# Patient Record
Sex: FEMALE | Race: WHITE | NOT HISPANIC OR LATINO | Employment: FULL TIME | URBAN - NONMETROPOLITAN AREA
[De-identification: names, ages, dates, MRNs, and addresses within clinical notes are randomized per-mention and may not be internally consistent; named-entity substitution may affect disease eponyms.]

---

## 2024-02-29 ENCOUNTER — OFFICE VISIT (OUTPATIENT)
Dept: PRIMARY CARE CLINIC | Facility: CLINIC | Age: 64
End: 2024-02-29

## 2024-02-29 VITALS
WEIGHT: 218.13 LBS | OXYGEN SATURATION: 100 % | HEIGHT: 66 IN | RESPIRATION RATE: 18 BRPM | HEART RATE: 63 BPM | DIASTOLIC BLOOD PRESSURE: 65 MMHG | SYSTOLIC BLOOD PRESSURE: 140 MMHG | TEMPERATURE: 99 F | BODY MASS INDEX: 35.06 KG/M2

## 2024-02-29 DIAGNOSIS — J30.2 SEASONAL ALLERGIC RHINITIS, UNSPECIFIED TRIGGER: ICD-10-CM

## 2024-02-29 DIAGNOSIS — Z76.89 ENCOUNTER TO ESTABLISH CARE: Primary | ICD-10-CM

## 2024-02-29 PROCEDURE — 99999 PR PBB SHADOW E&M-NEW PATIENT-LVL III: CPT | Mod: PBBFAC,,, | Performed by: NURSE PRACTITIONER

## 2024-02-29 PROCEDURE — 99202 OFFICE O/P NEW SF 15 MIN: CPT | Mod: S$PBB,,, | Performed by: NURSE PRACTITIONER

## 2024-02-29 PROCEDURE — 99999PBSHW PR PBB SHADOW TECHNICAL ONLY FILED TO HB: Mod: PBBFAC,,,

## 2024-02-29 PROCEDURE — 96372 THER/PROPH/DIAG INJ SC/IM: CPT | Mod: PBBFAC

## 2024-02-29 PROCEDURE — 99203 OFFICE O/P NEW LOW 30 MIN: CPT | Mod: PBBFAC | Performed by: NURSE PRACTITIONER

## 2024-02-29 RX ORDER — MONTELUKAST SODIUM 10 MG/1
10 TABLET ORAL DAILY
COMMUNITY
Start: 2024-01-02 | End: 2024-02-29 | Stop reason: SDUPTHER

## 2024-02-29 RX ORDER — HYDROCHLOROTHIAZIDE 50 MG/1
50 TABLET ORAL DAILY
COMMUNITY

## 2024-02-29 RX ORDER — FLUTICASONE PROPIONATE AND SALMETEROL 250; 50 UG/1; UG/1
1 POWDER RESPIRATORY (INHALATION) 2 TIMES DAILY
COMMUNITY
Start: 2024-02-23 | End: 2024-04-17 | Stop reason: SDUPTHER

## 2024-02-29 RX ORDER — TRIAMCINOLONE ACETONIDE 40 MG/ML
40 INJECTION, SUSPENSION INTRA-ARTICULAR; INTRAMUSCULAR
Status: COMPLETED | OUTPATIENT
Start: 2024-02-29 | End: 2024-02-29

## 2024-02-29 RX ORDER — MONTELUKAST SODIUM 10 MG/1
10 TABLET ORAL DAILY
Qty: 90 TABLET | Refills: 3 | Status: SHIPPED | OUTPATIENT
Start: 2024-02-29 | End: 2025-02-23

## 2024-02-29 RX ADMIN — TRIAMCINOLONE ACETONIDE 40 MG: 40 INJECTION, SUSPENSION INTRA-ARTICULAR; INTRAMUSCULAR at 04:02

## 2024-02-29 NOTE — PROGRESS NOTES
[facility] Note    HPI:  Romelia Givens is a 63 y.o. female who presents today for Establish Care (Pt here to establish care/congestion/allergies)      Review of Systems   Constitutional: Negative.    HENT:  Positive for congestion.    Eyes: Negative.    Respiratory:  Positive for sputum production.    Cardiovascular: Negative.    Gastrointestinal:  Positive for heartburn.   Genitourinary: Negative.    Musculoskeletal: Negative.    Skin: Negative.    Neurological: Negative.    Endo/Heme/Allergies: Negative.    Psychiatric/Behavioral: Negative.        A review of systems was performed and was negative except as noted above.    I personally reviewed allergies, past medical, surgical, social and family history and updated as appropriate.    Medications:    Current Outpatient Medications:     hydroCHLOROthiazide (HYDRODIURIL) 50 MG tablet, Take 50 mg by mouth once daily., Disp: , Rfl:     WIXELA INHUB 250-50 mcg/dose diskus inhaler, Inhale 1 puff into the lungs 2 (two) times daily., Disp: , Rfl:     montelukast (SINGULAIR) 10 mg tablet, Take 1 tablet (10 mg total) by mouth once daily., Disp: 90 tablet, Rfl: 3    Current Facility-Administered Medications:     triamcinolone acetonide injection 40 mg, 40 mg, Intramuscular, 1 time in Clinic/HOD, Doris Arzate NP     Health Maintenance:    There is no immunization history on file for this patient.   Health Maintenance   Topic Date Due    Hepatitis C Screening  Never done    Lipid Panel  Never done    Mammogram  Never done    Colorectal Cancer Screening  Never done    Shingles Vaccine (1 of 2) Never done    TETANUS VACCINE  07/01/2026     Health Maintenance Topics with due status: Not Due       Topic Last Completion Date    TETANUS VACCINE 07/01/2016     Health Maintenance Due   Topic Date Due    Hepatitis C Screening  Never done    Cervical Cancer Screening  Never done    Lipid Panel  Never done    COVID-19 Vaccine (1) Never done    HIV Screening  Never done  "   Mammogram  Never done    Hemoglobin A1c (Diabetic Prevention Screening)  Never done    Colorectal Cancer Screening  Never done    Shingles Vaccine (1 of 2) Never done    RSV Vaccine (Age 60+ and Pregnant patients) (1 - 1-dose 60+ series) Never done    Influenza Vaccine (1) 09/01/2023       PHYSICAL EXAM:  Vitals:    02/29/24 1615   BP: (!) 140/65   Pulse: 63   Resp: 18   Temp: 98.6 °F (37 °C)   TempSrc: Oral   SpO2: 100%   Weight: 98.9 kg (218 lb 1.6 oz)   Height: 5' 6" (1.676 m)     Body mass index is 35.2 kg/m².  Physical Exam  Constitutional:       Appearance: Normal appearance. She is normal weight.   HENT:      Head: Normocephalic.      Right Ear: Tympanic membrane, ear canal and external ear normal.      Left Ear: Tympanic membrane, ear canal and external ear normal.      Nose: Nose normal.      Mouth/Throat:      Mouth: Mucous membranes are moist.   Cardiovascular:      Rate and Rhythm: Normal rate and regular rhythm.      Pulses: Normal pulses.      Heart sounds: Normal heart sounds.   Pulmonary:      Effort: Pulmonary effort is normal.      Breath sounds: Normal breath sounds.   Abdominal:      General: Abdomen is flat.   Musculoskeletal:         General: Normal range of motion.      Cervical back: Normal range of motion.   Skin:     General: Skin is warm and dry.   Neurological:      General: No focal deficit present.      Mental Status: She is alert and oriented to person, place, and time.          ASSESSMENT/PLAN:  1. Encounter to establish care    2. Seasonal allergic rhinitis, unspecified trigger  -     triamcinolone acetonide injection 40 mg    Other orders  -     montelukast (SINGULAIR) 10 mg tablet; Take 1 tablet (10 mg total) by mouth once daily.  Dispense: 90 tablet; Refill: 3        Other than changes above, continue current medications and maintain follow up with specialists.      No follow-ups on file.   No results found for this or any previous visit (from the past 2016 " hour(s)).      Doris Arzate, NOA  [facility]

## 2024-03-06 DIAGNOSIS — Z12.31 OTHER SCREENING MAMMOGRAM: ICD-10-CM

## 2024-04-01 ENCOUNTER — OFFICE VISIT (OUTPATIENT)
Dept: PRIMARY CARE CLINIC | Facility: CLINIC | Age: 64
End: 2024-04-01

## 2024-04-01 VITALS
HEART RATE: 72 BPM | BODY MASS INDEX: 33.89 KG/M2 | RESPIRATION RATE: 18 BRPM | SYSTOLIC BLOOD PRESSURE: 143 MMHG | HEIGHT: 66 IN | DIASTOLIC BLOOD PRESSURE: 67 MMHG | OXYGEN SATURATION: 96 % | WEIGHT: 210.88 LBS | TEMPERATURE: 98 F

## 2024-04-01 DIAGNOSIS — B35.4 RINGWORM, BODY: Primary | ICD-10-CM

## 2024-04-01 DIAGNOSIS — E55.9 VITAMIN D DEFICIENCY: ICD-10-CM

## 2024-04-01 DIAGNOSIS — Z12.31 BREAST CANCER SCREENING BY MAMMOGRAM: ICD-10-CM

## 2024-04-01 DIAGNOSIS — Z13.0 SCREENING FOR IRON DEFICIENCY ANEMIA: ICD-10-CM

## 2024-04-01 DIAGNOSIS — Z12.11 SCREEN FOR COLON CANCER: ICD-10-CM

## 2024-04-01 DIAGNOSIS — Z13.220 NEED FOR LIPID SCREENING: ICD-10-CM

## 2024-04-01 DIAGNOSIS — Z11.59 ENCOUNTER FOR HEPATITIS C SCREENING TEST FOR LOW RISK PATIENT: ICD-10-CM

## 2024-04-01 DIAGNOSIS — Z13.29 THYROID DISORDER SCREEN: ICD-10-CM

## 2024-04-01 DIAGNOSIS — Z13.1 SCREENING FOR DIABETES MELLITUS (DM): ICD-10-CM

## 2024-04-01 DIAGNOSIS — J30.2 SEASONAL ALLERGIC RHINITIS, UNSPECIFIED TRIGGER: ICD-10-CM

## 2024-04-01 DIAGNOSIS — Z11.4 ENCOUNTER FOR SCREENING FOR HIV: ICD-10-CM

## 2024-04-01 PROCEDURE — 96372 THER/PROPH/DIAG INJ SC/IM: CPT | Mod: PBBFAC

## 2024-04-01 PROCEDURE — 99213 OFFICE O/P EST LOW 20 MIN: CPT | Mod: PBBFAC,25 | Performed by: NURSE PRACTITIONER

## 2024-04-01 PROCEDURE — 99213 OFFICE O/P EST LOW 20 MIN: CPT | Mod: S$PBB,,, | Performed by: NURSE PRACTITIONER

## 2024-04-01 PROCEDURE — 99999PBSHW PR PBB SHADOW TECHNICAL ONLY FILED TO HB: Mod: PBBFAC,,,

## 2024-04-01 PROCEDURE — 99999 PR PBB SHADOW E&M-EST. PATIENT-LVL III: CPT | Mod: PBBFAC,,, | Performed by: NURSE PRACTITIONER

## 2024-04-01 RX ORDER — TRIAMCINOLONE ACETONIDE 1 MG/G
CREAM TOPICAL 2 TIMES DAILY
Qty: 45 G | Refills: 0 | Status: SHIPPED | OUTPATIENT
Start: 2024-04-01 | End: 2024-05-01

## 2024-04-01 RX ORDER — CHOLECALCIFEROL (VITAMIN D3) 25 MCG
2000 TABLET ORAL DAILY
COMMUNITY

## 2024-04-01 RX ORDER — TRIAMCINOLONE ACETONIDE 40 MG/ML
40 INJECTION, SUSPENSION INTRA-ARTICULAR; INTRAMUSCULAR
Status: COMPLETED | OUTPATIENT
Start: 2024-04-01 | End: 2024-04-01

## 2024-04-01 RX ORDER — KETOCONAZOLE 20 MG/G
CREAM TOPICAL DAILY
Qty: 30 G | Refills: 0 | Status: SHIPPED | OUTPATIENT
Start: 2024-04-01

## 2024-04-01 RX ADMIN — TRIAMCINOLONE ACETONIDE 40 MG: 40 INJECTION, SUSPENSION INTRA-ARTICULAR; INTRAMUSCULAR at 11:04

## 2024-04-01 NOTE — PROGRESS NOTES
[facility] Note    HPI:  Romelia Givens is a 63 y.o. female who presents today for No chief complaint on file.      Review of Systems   Constitutional: Negative.    HENT:  Positive for congestion.    Eyes: Negative.    Respiratory:  Positive for cough.    Cardiovascular: Negative.    Gastrointestinal: Negative.    Genitourinary: Negative.    Musculoskeletal: Negative.    Skin:  Positive for rash (right buttock).   Neurological: Negative.    Endo/Heme/Allergies: Negative.    Psychiatric/Behavioral: Negative.        A review of systems was performed and was negative except as noted above.    I personally reviewed allergies, past medical, surgical, social and family history and updated as appropriate.    Medications:    Current Outpatient Medications:     hydroCHLOROthiazide (HYDRODIURIL) 50 MG tablet, Take 50 mg by mouth once daily., Disp: , Rfl:     montelukast (SINGULAIR) 10 mg tablet, Take 1 tablet (10 mg total) by mouth once daily., Disp: 90 tablet, Rfl: 3    WIXELA INHUB 250-50 mcg/dose diskus inhaler, Inhale 1 puff into the lungs 2 (two) times daily., Disp: , Rfl:     ketoconazole (NIZORAL) 2 % cream, Apply topically once daily., Disp: 30 g, Rfl: 0    triamcinolone acetonide 0.1% (KENALOG) 0.1 % cream, Apply topically 2 (two) times daily., Disp: 45 g, Rfl: 0    vitamin D (VITAMIN D3) 1000 units Tab, Take 2,000 Units by mouth once daily., Disp: , Rfl:     Current Facility-Administered Medications:     triamcinolone acetonide injection 40 mg, 40 mg, Intramuscular, 1 time in Clinic/HOD, Doris Arzate NP     Health Maintenance:  Immunization History   Administered Date(s) Administered    Influenza - Quadrivalent - PF *Preferred* (6 months and older) 10/10/2016    Influenza - Trivalent (ADULT) 11/30/2000, 10/01/2001, 10/07/2002, 10/04/2004, 10/03/2005, 11/01/2006, 09/16/2009, 02/21/2011, 09/21/2011, 11/01/2012, 09/20/2013    Pneumococcal Conjugate - 13 Valent 05/14/2014    Pneumococcal Polysaccharide  "- 23 Valent 10/10/2016    Td (ADULT) 04/22/2004    Tdap 07/01/2016      Health Maintenance   Topic Date Due    Hepatitis C Screening  Never done    Lipid Panel  Never done    Mammogram  Never done    Colorectal Cancer Screening  Never done    Shingles Vaccine (1 of 2) Never done    TETANUS VACCINE  07/01/2026     Health Maintenance Topics with due status: Not Due       Topic Last Completion Date    TETANUS VACCINE 07/01/2016     Health Maintenance Due   Topic Date Due    Hepatitis C Screening  Never done    Cervical Cancer Screening  Never done    Lipid Panel  Never done    COVID-19 Vaccine (1) Never done    HIV Screening  Never done    Mammogram  Never done    Hemoglobin A1c (Diabetic Prevention Screening)  Never done    Colorectal Cancer Screening  Never done    Shingles Vaccine (1 of 2) Never done    RSV Vaccine (Age 60+ and Pregnant patients) (1 - 1-dose 60+ series) Never done    Influenza Vaccine (1) 09/01/2023       PHYSICAL EXAM:  Vitals:    04/01/24 0959   BP: (!) 143/67   BP Location: Left arm   Patient Position: Sitting   BP Method: Large (Automatic)   Pulse: 72   Resp: 18   Temp: 98.4 °F (36.9 °C)   TempSrc: Temporal   SpO2: 96%   Weight: 95.7 kg (210 lb 14.4 oz)   Height: 5' 6" (1.676 m)     Body mass index is 34.04 kg/m².  Physical Exam  Constitutional:       Appearance: Normal appearance. She is normal weight.   HENT:      Head: Normocephalic.      Right Ear: Tympanic membrane, ear canal and external ear normal.      Left Ear: Tympanic membrane, ear canal and external ear normal.      Nose: Nose normal.      Mouth/Throat:      Mouth: Mucous membranes are moist.   Cardiovascular:      Rate and Rhythm: Normal rate and regular rhythm.      Pulses: Normal pulses.      Heart sounds: Normal heart sounds.   Pulmonary:      Effort: Pulmonary effort is normal.      Breath sounds: Normal breath sounds.   Musculoskeletal:         General: Normal range of motion.      Cervical back: Normal range of motion. "   Skin:     General: Skin is warm and dry.      Findings: Rash (right buttock) present.   Neurological:      General: No focal deficit present.      Mental Status: She is alert and oriented to person, place, and time.          ASSESSMENT/PLAN:  1. Ringworm, body  -     triamcinolone acetonide 0.1% (KENALOG) 0.1 % cream; Apply topically 2 (two) times daily.  Dispense: 45 g; Refill: 0  -     ketoconazole (NIZORAL) 2 % cream; Apply topically once daily.  Dispense: 30 g; Refill: 0    2. Seasonal allergic rhinitis, unspecified trigger  -     triamcinolone acetonide injection 40 mg    3. Screen for colon cancer  -     Ambulatory referral/consult to General Surgery; Future; Expected date: 04/08/2024    4. Breast cancer screening by mammogram  -     Mammo Digital Screening Bilat w/ Eleazar; Future; Expected date: 04/01/2024    5. Vitamin D deficiency  -     Misc Sendout Test, Blood Vitamin D; Future; Expected date: 04/01/2024    6. Screening for diabetes mellitus (DM)  -     Comprehensive Metabolic Panel; Future; Expected date: 04/01/2024  -     Hemoglobin A1C; Future; Expected date: 04/01/2024    7. Screening for iron deficiency anemia  -     CBC Auto Differential; Future; Expected date: 04/01/2024    8. Thyroid disorder screen  -     TSH; Future; Expected date: 04/01/2024    9. Need for lipid screening  -     Lipid Panel; Future; Expected date: 04/01/2024    10. Encounter for hepatitis C screening test for low risk patient  -     Hepatitis C Antibody; Future; Expected date: 04/01/2024    11. Encounter for screening for HIV  -     HIV 1/2 Ag/Ab (4th Gen); Future; Expected date: 04/01/2024        Other than changes above, continue current medications and maintain follow up with specialists.      No follow-ups on file.   No results found for this or any previous visit (from the past 2016 hour(s)).      NOA Smith  [facility]

## 2024-04-10 ENCOUNTER — PATIENT OUTREACH (OUTPATIENT)
Dept: ADMINISTRATIVE | Facility: HOSPITAL | Age: 64
End: 2024-04-10

## 2024-04-17 DIAGNOSIS — J45.20 MILD INTERMITTENT ASTHMA WITHOUT COMPLICATION: Primary | ICD-10-CM

## 2024-04-17 RX ORDER — ALBUTEROL SULFATE 90 UG/1
2 AEROSOL, METERED RESPIRATORY (INHALATION) EVERY 6 HOURS PRN
Qty: 18 G | Refills: 0 | Status: SHIPPED | OUTPATIENT
Start: 2024-04-17 | End: 2025-04-17

## 2024-04-17 RX ORDER — FLUTICASONE PROPIONATE AND SALMETEROL 250; 50 UG/1; UG/1
1 POWDER RESPIRATORY (INHALATION) 2 TIMES DAILY
Qty: 60 EACH | Refills: 11 | Status: SHIPPED | OUTPATIENT
Start: 2024-04-17 | End: 2024-04-22

## 2024-04-22 ENCOUNTER — TELEPHONE (OUTPATIENT)
Dept: SURGERY | Facility: CLINIC | Age: 64
End: 2024-04-22

## 2024-04-22 DIAGNOSIS — J45.20 MILD INTERMITTENT ASTHMA WITHOUT COMPLICATION: Primary | ICD-10-CM

## 2024-04-22 RX ORDER — FLUTICASONE PROPIONATE AND SALMETEROL 250; 50 UG/1; UG/1
1 POWDER RESPIRATORY (INHALATION) 2 TIMES DAILY
Qty: 60 EACH | Refills: 3 | Status: SHIPPED | OUTPATIENT
Start: 2024-04-22 | End: 2024-08-20

## 2024-05-28 ENCOUNTER — OFFICE VISIT (OUTPATIENT)
Dept: SURGERY | Facility: CLINIC | Age: 64
End: 2024-05-28

## 2024-05-28 VITALS
OXYGEN SATURATION: 97 % | HEIGHT: 66 IN | BODY MASS INDEX: 34.37 KG/M2 | DIASTOLIC BLOOD PRESSURE: 73 MMHG | WEIGHT: 213.88 LBS | HEART RATE: 80 BPM | SYSTOLIC BLOOD PRESSURE: 162 MMHG

## 2024-05-28 DIAGNOSIS — Z01.818 PREOPERATIVE CLEARANCE: ICD-10-CM

## 2024-05-28 DIAGNOSIS — Z12.12 SCREENING FOR COLORECTAL CANCER: Primary | ICD-10-CM

## 2024-05-28 DIAGNOSIS — Z12.11 SCREENING FOR COLORECTAL CANCER: Primary | ICD-10-CM

## 2024-05-28 PROCEDURE — 99999 PR PBB SHADOW E&M-EST. PATIENT-LVL V: CPT | Mod: PBBFAC,,,

## 2024-05-28 PROCEDURE — 99215 OFFICE O/P EST HI 40 MIN: CPT | Mod: PBBFAC

## 2024-05-28 PROCEDURE — 99204 OFFICE O/P NEW MOD 45 MIN: CPT | Mod: S$PBB,,,

## 2024-05-28 RX ORDER — SODIUM CHLORIDE 0.9 % (FLUSH) 0.9 %
10 SYRINGE (ML) INJECTION
OUTPATIENT
Start: 2024-05-28

## 2024-05-28 RX ORDER — SODIUM CHLORIDE 9 MG/ML
INJECTION, SOLUTION INTRAVENOUS CONTINUOUS
OUTPATIENT
Start: 2024-05-28

## 2024-05-28 NOTE — H&P
"[facility] H&P      Consult: Screening colonoscopy   Consulting Service: Doris Arzate NP  Chief Complaint: None        HPI: Pt is a 63 y.o.female who presents for Screening colonoscopy . No personal or family history of CRC. No previous colon cancer screening. Has chronic constipation. Denies melena, rectal bleeding or pain, change in bowel habits, or unintended weight loss. Denies CP, SOB, abdominal pain, nausea, vomiting, fevers, or chills.        PMH:   Past Medical History:   Diagnosis Date    Mild intermittent asthma, uncomplicated      PSH:   Past Surgical History:   Procedure Laterality Date     SECTION      MOLE REMOVAL      forehead     Meds: See medication list;  No anticoagulation  ALL: Nsaids (non-steroidal anti-inflammatory drug)  FHX: see above   SOC:   Social History     Socioeconomic History    Marital status:      Spouse name: santy kent    Number of children: 4   Tobacco Use    Smoking status: Never    Smokeless tobacco: Never   Substance and Sexual Activity    Alcohol use: Never    Sexual activity: Yes     Partners: Male     ROS: Review of Systems   Constitutional:  Negative for chills, diaphoresis, fever, malaise/fatigue and weight loss.   Respiratory:  Negative for cough, hemoptysis and shortness of breath.    Cardiovascular:  Negative for chest pain, palpitations and leg swelling.   Gastrointestinal:  Positive for constipation. Negative for abdominal pain, blood in stool, diarrhea, heartburn, melena, nausea and vomiting.   All other systems reviewed and are negative.          Physical Exam:  BP (!) 162/73   Pulse 80   Ht 5' 6" (1.676 m)   Wt 97 kg (213 lb 13.5 oz)   SpO2 97%   BMI 34.52 kg/m²   Physical Exam  Vitals reviewed.   Constitutional:       General: She is not in acute distress.     Appearance: Normal appearance. She is not ill-appearing, toxic-appearing or diaphoretic.   HENT:      Head: Normocephalic and atraumatic.      " Mouth/Throat:      Mouth: Mucous membranes are moist.   Eyes:      Conjunctiva/sclera: Conjunctivae normal.   Cardiovascular:      Rate and Rhythm: Normal rate and regular rhythm.   Pulmonary:      Effort: Pulmonary effort is normal. No respiratory distress.   Abdominal:      General: There is no distension.      Palpations: Abdomen is soft.      Tenderness: There is no abdominal tenderness. There is no guarding or rebound.   Musculoskeletal:         General: Normal range of motion.      Cervical back: Normal range of motion.   Skin:     General: Skin is warm.      Capillary Refill: Capillary refill takes less than 2 seconds.   Neurological:      Mental Status: She is alert and oriented to person, place, and time. Mental status is at baseline.   Psychiatric:         Mood and Affect: Mood normal.         Behavior: Behavior normal.         Thought Content: Thought content normal.         Judgment: Judgment normal.               A/P: Pt is a 63 y.o.female who presents for Screening colonoscopy   --To Endo on 7/5 for colonoscopy  --Procedure explained in detail to patient; including risks including but not limited to bleeding, infection, damage to surrounding structures, and perforation- patient voiced understanding  --Consent obtained and signed  --Pre-op orders placed  --Bowel prep given - Miralax/Gatorade  --Instructions reviewed and given to patient   --CLD day before procedure        Gus Alvarado NP  General Surgery   [phone]

## 2024-06-12 ENCOUNTER — HOSPITAL ENCOUNTER (OUTPATIENT)
Dept: RADIOLOGY | Facility: HOSPITAL | Age: 64
Discharge: HOME | End: 2024-06-12
Attending: NURSE PRACTITIONER

## 2024-06-12 VITALS — HEIGHT: 66 IN | BODY MASS INDEX: 34.23 KG/M2 | WEIGHT: 213 LBS

## 2024-06-12 DIAGNOSIS — Z12.31 BREAST CANCER SCREENING BY MAMMOGRAM: ICD-10-CM

## 2024-06-12 PROCEDURE — 77067 SCR MAMMO BI INCL CAD: CPT | Mod: TC

## 2024-06-14 DIAGNOSIS — R92.8 ABNORMAL MAMMOGRAM: Primary | ICD-10-CM

## 2024-06-19 ENCOUNTER — HOSPITAL ENCOUNTER (OUTPATIENT)
Dept: RADIOLOGY | Facility: HOSPITAL | Age: 64
Discharge: HOME | End: 2024-06-19
Attending: NURSE PRACTITIONER

## 2024-06-19 DIAGNOSIS — R92.8 ABNORMAL MAMMOGRAM: ICD-10-CM

## 2024-06-19 PROCEDURE — 76642 ULTRASOUND BREAST LIMITED: CPT | Mod: TC,LT

## 2024-06-26 ENCOUNTER — PATIENT OUTREACH (OUTPATIENT)
Dept: ADMINISTRATIVE | Facility: HOSPITAL | Age: 64
End: 2024-06-26

## 2024-07-03 ENCOUNTER — TELEPHONE (OUTPATIENT)
Dept: SURGERY | Facility: CLINIC | Age: 64
End: 2024-07-03

## 2024-07-08 ENCOUNTER — ANESTHESIA EVENT (OUTPATIENT)
Dept: ENDOSCOPY | Facility: HOSPITAL | Age: 64
End: 2024-07-08

## 2024-07-08 ENCOUNTER — HOSPITAL ENCOUNTER (OUTPATIENT)
Dept: PREADMISSION TESTING | Facility: HOSPITAL | Age: 64
Discharge: HOME | End: 2024-07-08
Attending: STUDENT IN AN ORGANIZED HEALTH CARE EDUCATION/TRAINING PROGRAM

## 2024-07-08 ENCOUNTER — HOSPITAL ENCOUNTER (OUTPATIENT)
Dept: PULMONOLOGY | Facility: HOSPITAL | Age: 64
Discharge: HOME | End: 2024-07-08
Attending: STUDENT IN AN ORGANIZED HEALTH CARE EDUCATION/TRAINING PROGRAM

## 2024-07-08 VITALS — BODY MASS INDEX: 34.23 KG/M2 | WEIGHT: 213 LBS | HEIGHT: 66 IN

## 2024-07-08 DIAGNOSIS — Z01.818 PREOPERATIVE CLEARANCE: ICD-10-CM

## 2024-07-08 LAB
OHS QRS DURATION: 82 MS
OHS QTC CALCULATION: 425 MS

## 2024-07-08 PROCEDURE — 93010 ELECTROCARDIOGRAM REPORT: CPT | Mod: ,,, | Performed by: INTERNAL MEDICINE

## 2024-07-08 PROCEDURE — 93005 ELECTROCARDIOGRAM TRACING: CPT

## 2024-07-08 NOTE — ANESTHESIA PREPROCEDURE EVALUATION
07/08/2024  Romelia Givens is a 63 y.o., female.      Pre-op Assessment    I have reviewed the Patient Summary Reports.    I have reviewed the NPO Status.   I have reviewed the Medications.     Review of Systems  Anesthesia Hx:  No problems with previous Anesthesia             Denies Family Hx of Anesthesia complications.    Denies Personal Hx of Anesthesia complications.                    Social:  Non-Smoker       Cardiovascular:  Cardiovascular Normal                                            Pulmonary:    Asthma mild                   Renal/:  Renal/ Normal                 Hepatic/GI:  Hepatic/GI Normal                 Neurological:  Neurology Normal                                      Endocrine:  Endocrine Normal                Physical Exam  General: Well nourished    Airway:  Mallampati: II / I  Mouth Opening: Normal  TM Distance: Normal  Tongue: Normal  Neck ROM: Normal ROM    Dental:  Intact    Chest/Lungs:  Clear to auscultation    Heart:  Rate: Normal  Rhythm: Regular Rhythm  Sounds: Normal      Lab Results   Component Value Date    WBC 6.36 06/12/2024    HGB 13.2 06/12/2024    HCT 40.0 06/12/2024    MCV 95 06/12/2024     06/12/2024      CMP  Sodium   Date Value Ref Range Status   06/12/2024 139 136 - 145 mmol/L Final     Potassium   Date Value Ref Range Status   06/12/2024 4.0 3.5 - 5.1 mmol/L Final     Chloride   Date Value Ref Range Status   06/12/2024 105 95 - 110 mmol/L Final     CO2   Date Value Ref Range Status   06/12/2024 26 23 - 29 mmol/L Final     Glucose   Date Value Ref Range Status   06/12/2024 85 70 - 110 mg/dL Final     BUN   Date Value Ref Range Status   06/12/2024 20 8 - 23 mg/dL Final     Creatinine   Date Value Ref Range Status   06/12/2024 0.8 0.5 - 1.4 mg/dL Final     Calcium   Date Value Ref Range Status   06/12/2024 9.0 8.7 - 10.5 mg/dL Final     Total Protein    Date Value Ref Range Status   06/12/2024 6.8 6.0 - 8.4 g/dL Final     Albumin   Date Value Ref Range Status   06/12/2024 3.4 (L) 3.5 - 5.2 g/dL Final     Total Bilirubin   Date Value Ref Range Status   06/12/2024 1.3 (H) 0.1 - 1.0 mg/dL Final     Comment:     For infants and newborns, interpretation of results should be based  on gestational age, weight and in agreement with clinical  observations.    Premature Infant recommended reference ranges:  Up to 24 hours.............<8.0 mg/dL  Up to 48 hours............<12.0 mg/dL  3-5 days..................<15.0 mg/dL  6-29 days.................<15.0 mg/dL    For patients on Eltrombopag therapy, use of Dimension [place] TBIL is   not   recommended.       Alkaline Phosphatase   Date Value Ref Range Status   06/12/2024 69 55 - 135 U/L Final     AST   Date Value Ref Range Status   06/12/2024 14 10 - 40 U/L Final     ALT   Date Value Ref Range Status   06/12/2024 25 10 - 44 U/L Final     Anion Gap   Date Value Ref Range Status   06/12/2024 8 3 - 11 mmol/L Final     eGFR   Date Value Ref Range Status   06/12/2024 >60.0 >60 mL/min/1.73 m^2 Final        Anesthesia Plan  Type of Anesthesia, risks & benefits discussed:    Anesthesia Type: MAC  Intra-op Monitoring Plan: Standard ASA Monitors  Post Op Pain Control Plan: multimodal analgesia  Induction:  IV  Airway Plan: Direct  Informed Consent: Informed consent signed with the Patient and all parties understand the risks and agree with anesthesia plan.  All questions answered.   ASA Score: 2  Day of Surgery Review of History & Physical: I have interviewed and examined the patient. I have reviewed the patient's H&P dated: There are no significant changes.     Ready For Surgery From Anesthesia Perspective.     .

## 2024-07-09 RX ORDER — CETIRIZINE HYDROCHLORIDE 5 MG/1
5 TABLET, CHEWABLE ORAL DAILY
COMMUNITY

## 2024-07-12 ENCOUNTER — ANESTHESIA (OUTPATIENT)
Dept: ENDOSCOPY | Facility: HOSPITAL | Age: 64
End: 2024-07-12

## 2024-07-12 ENCOUNTER — HOSPITAL ENCOUNTER (OUTPATIENT)
Facility: HOSPITAL | Age: 64
Discharge: HOME | End: 2024-07-12
Attending: STUDENT IN AN ORGANIZED HEALTH CARE EDUCATION/TRAINING PROGRAM | Admitting: STUDENT IN AN ORGANIZED HEALTH CARE EDUCATION/TRAINING PROGRAM

## 2024-07-12 VITALS
OXYGEN SATURATION: 95 % | DIASTOLIC BLOOD PRESSURE: 72 MMHG | RESPIRATION RATE: 18 BRPM | SYSTOLIC BLOOD PRESSURE: 111 MMHG | TEMPERATURE: 98 F | HEART RATE: 72 BPM

## 2024-07-12 DIAGNOSIS — Z12.11 SCREENING FOR COLORECTAL CANCER: ICD-10-CM

## 2024-07-12 DIAGNOSIS — Z12.12 SCREENING FOR COLORECTAL CANCER: ICD-10-CM

## 2024-07-12 DIAGNOSIS — Z12.11 SCREEN FOR COLON CANCER: Primary | ICD-10-CM

## 2024-07-12 PROCEDURE — 37000009 HC ANESTHESIA EA ADD 15 MINS: Performed by: STUDENT IN AN ORGANIZED HEALTH CARE EDUCATION/TRAINING PROGRAM

## 2024-07-12 PROCEDURE — G0121 COLON CA SCRN NOT HI RSK IND: HCPCS | Mod: ,,, | Performed by: STUDENT IN AN ORGANIZED HEALTH CARE EDUCATION/TRAINING PROGRAM

## 2024-07-12 PROCEDURE — G0121 COLON CA SCRN NOT HI RSK IND: HCPCS | Performed by: STUDENT IN AN ORGANIZED HEALTH CARE EDUCATION/TRAINING PROGRAM

## 2024-07-12 PROCEDURE — 37000008 HC ANESTHESIA 1ST 15 MINUTES: Performed by: STUDENT IN AN ORGANIZED HEALTH CARE EDUCATION/TRAINING PROGRAM

## 2024-07-12 PROCEDURE — 25000003 PHARM REV CODE 250

## 2024-07-12 RX ORDER — LIDOCAINE HYDROCHLORIDE 10 MG/ML
INJECTION, SOLUTION INTRAVENOUS
Status: DISCONTINUED | OUTPATIENT
Start: 2024-07-12 | End: 2024-07-12

## 2024-07-12 RX ORDER — PROPOFOL 10 MG/ML
VIAL (ML) INTRAVENOUS
Status: DISCONTINUED | OUTPATIENT
Start: 2024-07-12 | End: 2024-07-12

## 2024-07-12 RX ORDER — SODIUM CHLORIDE 9 MG/ML
INJECTION, SOLUTION INTRAVENOUS CONTINUOUS
Status: DISCONTINUED | OUTPATIENT
Start: 2024-07-12 | End: 2024-07-12 | Stop reason: HOSPADM

## 2024-07-12 RX ORDER — ALBUTEROL SULFATE 0.63 MG/3ML
0.63 SOLUTION RESPIRATORY (INHALATION) EVERY 6 HOURS PRN
COMMUNITY

## 2024-07-12 RX ORDER — SODIUM CHLORIDE 0.9 % (FLUSH) 0.9 %
10 SYRINGE (ML) INJECTION
Status: DISCONTINUED | OUTPATIENT
Start: 2024-07-12 | End: 2024-07-12 | Stop reason: HOSPADM

## 2024-07-12 RX ADMIN — LIDOCAINE HYDROCHLORIDE 50 MG: 10 INJECTION, SOLUTION INTRAVENOUS at 07:07

## 2024-07-12 RX ADMIN — SODIUM CHLORIDE: 0.9 INJECTION, SOLUTION INTRAVENOUS at 07:07

## 2024-07-12 RX ADMIN — LIDOCAINE HYDROCHLORIDE 100 MG: 10 INJECTION, SOLUTION INTRAVENOUS at 07:07

## 2024-07-12 RX ADMIN — LIDOCAINE HYDROCHLORIDE 50 MG: 10 INJECTION, SOLUTION INTRAVENOUS at 08:07

## 2024-07-12 RX ADMIN — Medication 100 MG: at 07:07

## 2024-07-12 NOTE — DISCHARGE INSTRUCTIONS
FOLLOW UP WITH DR LENZ AS INSTRUCTED.    NO DRIVING OR DRINKING ALCOHOL FOR 24 HOURS.    CALL DR LENZ'S OFFICE FOR ANY QUESTIONS OR CONCERNS.  REPORT TO THE ER IF URGENT.    THANK YOU FOR CHOOSING [facility]!

## 2024-07-12 NOTE — ANESTHESIA POSTPROCEDURE EVALUATION
Anesthesia Post Evaluation    Patient: Romelia Givens    Procedure(s) Performed: Procedure(s) (LRB):  COLONOSCOPY (N/A)    Final Anesthesia Type: MAC      Patient location during evaluation: OPS  Patient participation: Yes- Able to Participate  Level of consciousness: awake  Post-procedure vital signs: reviewed and stable  Pain management: adequate  Airway patency: patent    PONV status at discharge: No PONV  Anesthetic complications: no      Cardiovascular status: blood pressure returned to baseline  Respiratory status: spontaneous ventilation  Hydration status: euvolemic  Follow-up not needed.              Vitals Value Taken Time   /72 07/12/24 0820   Temp 36.4 °C (97.5 °F) 07/12/24 0820   Pulse 72 07/12/24 0820   Resp 18 07/12/24 0820   SpO2 95 % 07/12/24 0820         No case tracking events are documented in the log.      Pain/John Score: John Score: 10 (7/12/2024  8:20 AM)

## 2024-07-12 NOTE — DISCHARGE SUMMARY
[place] - [facility]oscopy  Discharge Note  Short Stay    Procedure(s) (LRB):  COLONOSCOPY (N/A)      OUTCOME: Patient tolerated treatment/procedure well without complication and is now ready for discharge.    DISPOSITION: Home or Self Care    FINAL DIAGNOSIS:  Screen for colon cancer    FOLLOWUP:  as needed    DISCHARGE INSTRUCTIONS:    Discharge Procedure Orders   Diet Adult Regular     No driving until:     Notify your health care provider if you experience any of the following:  temperature >100.4     Notify your health care provider if you experience any of the following:  persistent nausea and vomiting or diarrhea     Notify your health care provider if you experience any of the following:  severe uncontrolled pain     Activity as tolerated        TIME SPENT ON DISCHARGE: 5 minutes

## 2024-07-12 NOTE — TRANSFER OF CARE
Anesthesia Transfer of Care Note    Patient: Romelia Givens    Procedure(s) Performed: Procedure(s) (LRB):  COLONOSCOPY (N/A)    Patient location: GI    Anesthesia Type: MAC    Transport from OR: Transported from OR on room air with adequate spontaneous ventilation    Post pain: adequate analgesia    Post assessment: no apparent anesthetic complications    Post vital signs: stable    Level of consciousness: awake    Nausea/Vomiting: no nausea/vomiting    Complications: none    Transfer of care protocol was followed      Last vitals:   97/53  16 RR  96%  O2 SAT  81 HR

## 2024-07-12 NOTE — PLAN OF CARE
Received pt to room 521 accompanied by Zenia,RN, pt aa&ox3, no c/o. Snack provided.  at bedside. Call bell in reach. Call with needs.

## 2024-07-12 NOTE — LETTER
July 12, 2024    Romelia Givens  [address] [address]                   [address] [address]  Phone: [phone]  Fax: [phone]   Dear Mrs. Romelia Givens:    Thank you for choosing [facility] for your recent colonoscopy!  We strive to provide our patients with the highest quality preventative care because your health is worth it!    Your recent colonoscopy with Dr. Callejas was normal.     Since you do not have a family history of colon or rectal cancer, it is recommended that you undergo another colonoscopy in 10 years.    I would also recommend colon cancer screening for any first-degree relatives (brothers, sisters, children, and parents) beginning at age 45. Make your family members aware of these results so that they can discuss screening with their physician    I would suggest you consider changing any health habits that might increase your chance of forming more precancerous polyps and thus colorectal cancer. You may lower your chance of developing future polyps and colorectal cancer by adopting healthy habits such as not smoking, maintaining a healthy body weight, being physically active, limiting red and processed meat (such as beef, cold cuts, kamara, and hot dogs),minimizing alcohol intake (or avoiding alcohol altogether), and eating a diet with a lot of fruits and vegetables.    Please feel free to contact me at [phone] with any questions or concerns.    Thank you for allowing me to participate in your care!    Sincerely,     Kady Callejas MD

## 2024-07-12 NOTE — H&P
Patient seen and examined.  No interval change since the below obtained H&P  Informed consent verified    NPO since midnight  Prep completed - Miralax/gatorade  No anticoagulation  All questions and concerns addressed  To Endo for screening colonoscopy     Kady Callejas MD  General Surgery   [phone]      [facility]  General Surgery Clinic H&P      Consult: Screening colonoscopy   Consulting Service: Kady Callejas MD  Chief Complaint: None        HPI: Pt is a 63 y.o.female who presents for Screening colonoscopy . No personal or family history of CRC. No previous colon cancer screening. Has chronic constipation. Denies melena, rectal bleeding or pain, change in bowel habits, or unintended weight loss. Denies CP, SOB, abdominal pain, nausea, vomiting, fevers, or chills.        PMH:   Past Medical History:   Diagnosis Date    Colon cancer screening 2024    Mild intermittent asthma, uncomplicated      PSH:   Past Surgical History:   Procedure Laterality Date     SECTION      MOLE REMOVAL      forehead     Meds: See medication list;  No anticoagulation  ALL: Nsaids (non-steroidal anti-inflammatory drug)  FHX: see above   SOC:   Social History     Socioeconomic History    Marital status:      Spouse name: santy kent    Number of children: 4   Tobacco Use    Smoking status: Never    Smokeless tobacco: Never   Substance and Sexual Activity    Alcohol use: Never    Drug use: Never    Sexual activity: Yes     Partners: Male     Comment:      ROS: Review of Systems   Constitutional:  Negative for chills, diaphoresis, fever, malaise/fatigue and weight loss.   Respiratory:  Negative for cough, hemoptysis and shortness of breath.    Cardiovascular:  Negative for chest pain, palpitations and leg swelling.   Gastrointestinal:  Positive for constipation. Negative for abdominal pain, blood in stool, diarrhea, heartburn, melena, nausea and vomiting.   All other systems reviewed and are  negative.          Physical Exam:  BP (!) 159/87 (BP Location: Left arm, Patient Position: Lying)   Pulse 75   Temp 97.8 °F (36.6 °C) (Oral)   Resp 20   SpO2 97%   Physical Exam  Vitals reviewed.   Constitutional:       General: She is not in acute distress.     Appearance: Normal appearance. She is not ill-appearing, toxic-appearing or diaphoretic.   HENT:      Head: Normocephalic and atraumatic.      Mouth/Throat:      Mouth: Mucous membranes are moist.   Eyes:      Conjunctiva/sclera: Conjunctivae normal.   Cardiovascular:      Rate and Rhythm: Normal rate and regular rhythm.   Pulmonary:      Effort: Pulmonary effort is normal. No respiratory distress.   Abdominal:      General: There is no distension.      Palpations: Abdomen is soft.      Tenderness: There is no abdominal tenderness. There is no guarding or rebound.   Musculoskeletal:         General: Normal range of motion.      Cervical back: Normal range of motion.   Skin:     General: Skin is warm.      Capillary Refill: Capillary refill takes less than 2 seconds.   Neurological:      Mental Status: She is alert and oriented to person, place, and time. Mental status is at baseline.   Psychiatric:         Mood and Affect: Mood normal.         Behavior: Behavior normal.         Thought Content: Thought content normal.         Judgment: Judgment normal.               A/P: Pt is a 63 y.o.female who presents for Screening colonoscopy   --To Endo on 7/5 for colonoscopy  --Procedure explained in detail to patient; including risks including but not limited to bleeding, infection, damage to surrounding structures, and perforation- patient voiced understanding  --Consent obtained and signed  --Pre-op orders placed  --Bowel prep given - Miralax/Gatorade  --Instructions reviewed and given to patient   --CLD day before procedure        Gus Alvarado NP  General Surgery   [phone]

## 2024-07-12 NOTE — OP NOTE
[facility] - OR Peri Services  General Surgery Department  Operative Note    SUMMARY     Date of Procedure: 7/12/2024    Procedure: Procedure(s) (LRB):  COLONOSCOPY:      Surgeon(s) and Role:  Kady Callejas MD     Pre-Operative Diagnosis: Pre-Op Diagnosis Codes:     * Screen for colon cancer [Z12.11]    Post-Operative Diagnosis: Same         Anesthesia: Local MAC      Findings:  -No masses or polypoid lesions seen.    -There was normal mucosa with normal submucosal venous pattern.    -No vascular lesions were identified.    -No major diverticular disease was encountered.   -Grade 1 internal hemorrhoids with external component    Indications for the Procedure:  62yo female with no family history of colorectal cancer who presents for screening colonoscopy.    Operative Conduct in Detail:   The risks, benefits, and alternatives were thoroughly discussed with the patient. Despite the risks, the patient wished to proceed. Informed consent was obtained and it will scanned to the electronic chart.      The patient was placed on left lateral decubitus. After achieving moderate sedation, a digital rectal examination was performed; subsequently, a standard colonoscope was introduced through the anus and advanced without difficulty up to the cecum where terminal ileum and appendiceal orifice were visualized The scope was withdrawn slowly while the mucosa was carefully examined. The following findings were seen:    Bowel Preparation: good  Rectal exam was normal with good rectal tone and no masses or blood detected in the rectal vault.   No masses or polypoid lesions seen.    There was normal mucosa with normal submucosal venous pattern.    No vascular lesions were identified.    No major diverticular disease was encountered.   Grade 1 internal hemorrhoids with external component    Withdrawal time >6 minutes (if no biopsies/polypectomies obtained)      Complications: No    Estimated Blood Loss (EBL): None              Specimens:   Specimens (From admission, onward)      None                   Condition: Good    Disposition: PACU - hemodynamically stable.    Recommendation:    Repeat colonoscopy in 10 years    Kady Callejas MD  General Surgery   [phone]

## 2024-10-15 ENCOUNTER — OFFICE VISIT (OUTPATIENT)
Dept: PRIMARY CARE CLINIC | Facility: CLINIC | Age: 64
End: 2024-10-15

## 2024-10-15 VITALS
TEMPERATURE: 98 F | OXYGEN SATURATION: 96 % | HEART RATE: 67 BPM | SYSTOLIC BLOOD PRESSURE: 124 MMHG | DIASTOLIC BLOOD PRESSURE: 80 MMHG | HEIGHT: 66 IN | BODY MASS INDEX: 33.91 KG/M2 | RESPIRATION RATE: 16 BRPM | WEIGHT: 211 LBS

## 2024-10-15 DIAGNOSIS — J01.10 ACUTE NON-RECURRENT FRONTAL SINUSITIS: Primary | ICD-10-CM

## 2024-10-15 PROCEDURE — 99999PBSHW PR PBB SHADOW TECHNICAL ONLY FILED TO HB: Mod: PBBFAC,,,

## 2024-10-15 PROCEDURE — 99213 OFFICE O/P EST LOW 20 MIN: CPT | Mod: PBBFAC | Performed by: NURSE PRACTITIONER

## 2024-10-15 PROCEDURE — 96372 THER/PROPH/DIAG INJ SC/IM: CPT | Mod: PBBFAC

## 2024-10-15 PROCEDURE — 99213 OFFICE O/P EST LOW 20 MIN: CPT | Mod: S$PBB,,, | Performed by: NURSE PRACTITIONER

## 2024-10-15 PROCEDURE — 99999 PR PBB SHADOW E&M-EST. PATIENT-LVL III: CPT | Mod: PBBFAC,,, | Performed by: NURSE PRACTITIONER

## 2024-10-15 RX ORDER — AZITHROMYCIN 250 MG/1
TABLET, FILM COATED ORAL
Qty: 6 TABLET | Refills: 0 | Status: SHIPPED | OUTPATIENT
Start: 2024-10-15 | End: 2024-10-20

## 2024-10-15 RX ORDER — BETAMETHASONE SODIUM PHOSPHATE AND BETAMETHASONE ACETATE 3; 3 MG/ML; MG/ML
6 INJECTION, SUSPENSION INTRA-ARTICULAR; INTRALESIONAL; INTRAMUSCULAR; SOFT TISSUE
Status: COMPLETED | OUTPATIENT
Start: 2024-10-15 | End: 2024-10-15

## 2024-10-15 RX ADMIN — BETAMETHASONE SODIUM PHOSPHATE AND BETAMETHASONE ACETATE 6 MG: 3; 3 INJECTION, SUSPENSION INTRA-ARTICULAR; INTRALESIONAL; INTRAMUSCULAR at 09:10

## 2024-10-15 NOTE — PROGRESS NOTES
[facility] Note    HPI:  Romelia Givens is a 64 y.o. female who presents today for Nasal Congestion and Cough     Symptoms x 2 weeks    Review of Systems   Constitutional: Negative.    HENT:  Positive for congestion and sinus pain.    Eyes: Negative.    Respiratory:  Positive for cough.    Cardiovascular: Negative.    Gastrointestinal: Negative.    Genitourinary: Negative.    Musculoskeletal: Negative.    Skin: Negative.    Neurological: Negative.    Endo/Heme/Allergies: Negative.    Psychiatric/Behavioral: Negative.        A review of systems was performed and was negative except as noted above.    I personally reviewed allergies, past medical, surgical, social and family history and updated as appropriate.    Medications:    Current Outpatient Medications:     albuterol (ACCUNEB) 0.63 mg/3 mL Nebu, Take 0.63 mg by nebulization every 6 (six) hours as needed. Rescue, Disp: , Rfl:     albuterol (VENTOLIN HFA) 90 mcg/actuation inhaler, Inhale 2 puffs into the lungs every 6 (six) hours as needed for Wheezing. Rescue, Disp: 18 g, Rfl: 0    cetirizine (ZYRTEC) 5 MG chewable tablet, Take 5 mg by mouth once daily., Disp: , Rfl:     fluticasone-salmeterol diskus inhaler 250-50 mcg, Inhale 1 puff into the lungs 2 (two) times daily. Controller, Disp: 60 each, Rfl: 3    montelukast (SINGULAIR) 10 mg tablet, Take 1 tablet (10 mg total) by mouth once daily., Disp: 90 tablet, Rfl: 3    vitamin D (VITAMIN D3) 1000 units Tab, Take 2,000 Units by mouth once daily., Disp: , Rfl:     azithromycin (Z-AYDEN) 250 MG tablet, Take 2 tablets by mouth on day 1; Take 1 tablet by mouth on days 2-5, Disp: 6 tablet, Rfl: 0    Current Facility-Administered Medications:     betamethasone acetate-betamethasone sodium phosphate injection 6 mg, 6 mg, Intramuscular, 1 time in Clinic/HOD,      Health Maintenance:  Immunization History   Administered Date(s) Administered    Influenza - Quadrivalent - PF *Preferred* (6 months and older)  "10/10/2016    Influenza - Trivalent - Afluria, Fluzone MDV 11/30/2000, 10/01/2001, 10/07/2002, 10/04/2004, 10/03/2005, 11/01/2006, 09/16/2009, 02/21/2011, 09/21/2011, 11/01/2012, 09/20/2013    Pneumococcal Conjugate - 13 Valent 05/14/2014    Pneumococcal Polysaccharide - 23 Valent 10/10/2016    Td (ADULT) 04/22/2004    Tdap 07/01/2016      Health Maintenance   Topic Date Due    Shingles Vaccine (1 of 2) Never done    Mammogram  06/12/2025    TETANUS VACCINE  07/01/2026    Lipid Panel  06/12/2029    Colorectal Cancer Screening  07/12/2034    Hepatitis C Screening  Completed     Health Maintenance Topics with due status: Not Due       Topic Last Completion Date    TETANUS VACCINE 07/01/2016    Hemoglobin A1c (Diabetic Prevention Screening) 06/12/2024    Mammogram 06/12/2024    Lipid Panel 06/12/2024    Colorectal Cancer Screening 07/12/2024    RSV Vaccine (Age 60+ and Pregnant patients) Not Due     Health Maintenance Due   Topic Date Due    Cervical Cancer Screening  Never done    Shingles Vaccine (1 of 2) Never done    Influenza Vaccine (1) 09/01/2024    COVID-19 Vaccine (1 - 2024-25 season) Never done       PHYSICAL EXAM:  Vitals:    10/15/24 0938   BP: 124/80   Pulse: 67   Resp: 16   Temp: 97.9 °F (36.6 °C)   SpO2: 96%   Weight: 95.7 kg (211 lb)   Height: 5' 6" (1.676 m)     Body mass index is 34.06 kg/m².  Physical Exam  Constitutional:       Appearance: Normal appearance. She is normal weight.   HENT:      Head: Normocephalic.      Right Ear: Tympanic membrane, ear canal and external ear normal.      Left Ear: Tympanic membrane, ear canal and external ear normal.      Nose: Nose normal.      Mouth/Throat:      Mouth: Mucous membranes are moist.   Cardiovascular:      Rate and Rhythm: Normal rate and regular rhythm.      Pulses: Normal pulses.      Heart sounds: Normal heart sounds.   Pulmonary:      Effort: Pulmonary effort is normal.      Breath sounds: Normal breath sounds.   Musculoskeletal:         General: " Normal range of motion.      Cervical back: Normal range of motion.   Skin:     General: Skin is warm and dry.   Neurological:      General: No focal deficit present.      Mental Status: She is alert and oriented to person, place, and time.          ASSESSMENT/PLAN:  1. Acute non-recurrent frontal sinusitis  -     azithromycin (Z-AYDEN) 250 MG tablet; Take 2 tablets by mouth on day 1; Take 1 tablet by mouth on days 2-5  Dispense: 6 tablet; Refill: 0  -     betamethasone acetate-betamethasone sodium phosphate injection 6 mg        Other than changes above, continue current medications and maintain follow up with specialists.      Follow up if symptoms worsen or fail to improve.   No results found for this or any previous visit (from the past 12 weeks).      Doris Arzate, NOA  [facility]

## 2024-12-06 ENCOUNTER — PATIENT MESSAGE (OUTPATIENT)
Dept: ADMINISTRATIVE | Facility: HOSPITAL | Age: 64
End: 2024-12-06

## 2025-01-24 ENCOUNTER — OFFICE VISIT (OUTPATIENT)
Dept: PRIMARY CARE CLINIC | Facility: CLINIC | Age: 65
End: 2025-01-24

## 2025-01-24 VITALS
BODY MASS INDEX: 33.59 KG/M2 | DIASTOLIC BLOOD PRESSURE: 70 MMHG | TEMPERATURE: 98 F | SYSTOLIC BLOOD PRESSURE: 144 MMHG | HEIGHT: 66 IN | HEART RATE: 68 BPM | WEIGHT: 209 LBS | OXYGEN SATURATION: 98 %

## 2025-01-24 DIAGNOSIS — J01.10 ACUTE NON-RECURRENT FRONTAL SINUSITIS: Primary | ICD-10-CM

## 2025-01-24 PROCEDURE — 96372 THER/PROPH/DIAG INJ SC/IM: CPT | Mod: PBBFAC

## 2025-01-24 PROCEDURE — 99213 OFFICE O/P EST LOW 20 MIN: CPT | Mod: PBBFAC | Performed by: NURSE PRACTITIONER

## 2025-01-24 PROCEDURE — 99999PBSHW PR PBB SHADOW TECHNICAL ONLY FILED TO HB: Mod: PBBFAC,,,

## 2025-01-24 PROCEDURE — 99999 PR PBB SHADOW E&M-EST. PATIENT-LVL III: CPT | Mod: PBBFAC,,, | Performed by: NURSE PRACTITIONER

## 2025-01-24 PROCEDURE — 99213 OFFICE O/P EST LOW 20 MIN: CPT | Mod: S$PBB,,, | Performed by: NURSE PRACTITIONER

## 2025-01-24 RX ORDER — AZITHROMYCIN 250 MG/1
TABLET, FILM COATED ORAL
Qty: 6 TABLET | Refills: 0 | Status: SHIPPED | OUTPATIENT
Start: 2025-01-24 | End: 2025-01-29

## 2025-01-24 RX ORDER — BETAMETHASONE SODIUM PHOSPHATE AND BETAMETHASONE ACETATE 3; 3 MG/ML; MG/ML
6 INJECTION, SUSPENSION INTRA-ARTICULAR; INTRALESIONAL; INTRAMUSCULAR; SOFT TISSUE
Status: COMPLETED | OUTPATIENT
Start: 2025-01-24 | End: 2025-01-24

## 2025-01-24 RX ADMIN — BETAMETHASONE SODIUM PHOSPHATE AND BETAMETHASONE ACETATE 6 MG: 3; 3 INJECTION, SUSPENSION INTRA-ARTICULAR; INTRALESIONAL; INTRAMUSCULAR at 11:01

## 2025-01-24 NOTE — PROGRESS NOTES
[facility] Note    HPI:  Romelia Givens is a 64 y.o. female who presents today for Sinus Problem (Sinus issue)         Review of Systems   Constitutional: Negative.    HENT:  Positive for congestion and sore throat.    Eyes: Negative.    Respiratory:  Positive for cough.    Cardiovascular: Negative.    Gastrointestinal: Negative.    Genitourinary: Negative.    Musculoskeletal: Negative.    Skin: Negative.    Neurological: Negative.    Endo/Heme/Allergies: Negative.    Psychiatric/Behavioral: Negative.        A review of systems was performed and was negative except as noted above.    I personally reviewed allergies, past medical, surgical, social and family history and updated as appropriate.    Medications:    Current Outpatient Medications:     albuterol (ACCUNEB) 0.63 mg/3 mL Nebu, Take 0.63 mg by nebulization every 6 (six) hours as needed. Rescue, Disp: , Rfl:     albuterol (VENTOLIN HFA) 90 mcg/actuation inhaler, Inhale 2 puffs into the lungs every 6 (six) hours as needed for Wheezing. Rescue, Disp: 18 g, Rfl: 0    cetirizine (ZYRTEC) 5 MG chewable tablet, Take 5 mg by mouth once daily., Disp: , Rfl:     montelukast (SINGULAIR) 10 mg tablet, Take 1 tablet (10 mg total) by mouth once daily., Disp: 90 tablet, Rfl: 3    vitamin D (VITAMIN D3) 1000 units Tab, Take 2,000 Units by mouth once daily., Disp: , Rfl:     azithromycin (Z-AYDEN) 250 MG tablet, Take 2 tablets by mouth on day 1; Take 1 tablet by mouth on days 2-5, Disp: 6 tablet, Rfl: 0    fluticasone-salmeterol diskus inhaler 250-50 mcg, Inhale 1 puff into the lungs 2 (two) times daily. Controller, Disp: 60 each, Rfl: 3  No current facility-administered medications for this visit.     Health Maintenance:  Immunization History   Administered Date(s) Administered    Influenza - Quadrivalent - PF *Preferred* (6 months and older) 10/10/2016    Influenza - Trivalent - Afluria, Fluzone MDV 11/30/2000, 10/01/2001, 10/07/2002, 10/04/2004, 10/03/2005,  "11/01/2006, 09/16/2009, 02/21/2011, 09/21/2011, 11/01/2012, 09/20/2013    Pneumococcal Conjugate - 13 Valent 05/14/2014    Pneumococcal Polysaccharide - 23 Valent 10/10/2016    Td (ADULT) 04/22/2004    Tdap 07/01/2016      Health Maintenance   Topic Date Due    Cervical Cancer Screening  Never done    Shingles Vaccine (1 of 2) 01/24/2026 (Originally 9/2/2012)    COVID-19 Vaccine (1 - 2024-25 season) 01/24/2026 (Originally 9/1/2024)    Pneumococcal Vaccines (Age 50+) (3 of 3 - PCV20 or PCV21) 01/24/2026 (Originally 10/10/2021)    Mammogram  06/12/2025    TETANUS VACCINE  07/01/2026    Hemoglobin A1c (Diabetic Prevention Screening)  06/12/2027    Lipid Panel  06/12/2029    Colorectal Cancer Screening  07/12/2034    RSV Vaccine (Age 60+ and Pregnant patients) (1 - 1-dose 75+ series) 09/02/2037    Hepatitis C Screening  Completed    HIV Screening  Completed    Influenza Vaccine  Discontinued     Health Maintenance Topics with due status: Not Due       Topic Last Completion Date    TETANUS VACCINE 07/01/2016    Hemoglobin A1c (Diabetic Prevention Screening) 06/12/2024    Mammogram 06/12/2024    Lipid Panel 06/12/2024    Colorectal Cancer Screening 07/12/2024    RSV Vaccine (Age 60+ and Pregnant patients) Not Due     Health Maintenance Due   Topic Date Due    Cervical Cancer Screening  Never done       PHYSICAL EXAM:  Vitals:    01/24/25 1022   BP: (!) 144/70   Patient Position: Sitting   Pulse: 68   Temp: 97.7 °F (36.5 °C)   SpO2: 98%   Weight: 94.8 kg (209 lb)   Height: 5' 6" (1.676 m)     Body mass index is 33.73 kg/m².  Physical Exam  Vitals and nursing note reviewed.   Constitutional:       Appearance: Normal appearance. She is normal weight.   HENT:      Head: Normocephalic.      Right Ear: Tympanic membrane, ear canal and external ear normal.      Left Ear: Tympanic membrane, ear canal and external ear normal.      Nose: Nose normal.      Mouth/Throat:      Mouth: Mucous membranes are moist.   Cardiovascular:      " Rate and Rhythm: Normal rate and regular rhythm.      Pulses: Normal pulses.      Heart sounds: Normal heart sounds.   Pulmonary:      Effort: Pulmonary effort is normal.      Breath sounds: Normal breath sounds.   Musculoskeletal:         General: Normal range of motion.      Cervical back: Normal range of motion.   Skin:     General: Skin is warm and dry.   Neurological:      General: No focal deficit present.      Mental Status: She is alert and oriented to person, place, and time.          ASSESSMENT/PLAN:  1. Acute non-recurrent frontal sinusitis  -     betamethasone acetate-betamethasone sodium phosphate injection 6 mg  -     azithromycin (Z-AYDEN) 250 MG tablet; Take 2 tablets by mouth on day 1; Take 1 tablet by mouth on days 2-5  Dispense: 6 tablet; Refill: 0        Other than changes above, continue current medications and maintain follow up with specialists.      Follow up if symptoms worsen or fail to improve.   No results found for this or any previous visit (from the past 12 weeks).      Doris Arzate, NOA  [facility]

## 2025-02-28 RX ORDER — MONTELUKAST SODIUM 10 MG/1
10 TABLET ORAL
COMMUNITY
Start: 2024-12-01 | End: 2025-02-28 | Stop reason: SDUPTHER

## 2025-02-28 RX ORDER — MONTELUKAST SODIUM 10 MG/1
10 TABLET ORAL DAILY
Qty: 30 TABLET | Refills: 11 | Status: SHIPPED | OUTPATIENT
Start: 2025-02-28

## 2025-04-29 DIAGNOSIS — J45.20 MILD INTERMITTENT ASTHMA WITHOUT COMPLICATION: ICD-10-CM

## 2025-04-29 RX ORDER — FLUTICASONE PROPIONATE AND SALMETEROL 250; 50 UG/1; UG/1
1 POWDER RESPIRATORY (INHALATION) 2 TIMES DAILY
Qty: 60 EACH | Refills: 11 | Status: SHIPPED | OUTPATIENT
Start: 2025-04-29

## 2025-05-02 ENCOUNTER — PATIENT OUTREACH (OUTPATIENT)
Dept: ADMINISTRATIVE | Facility: HOSPITAL | Age: 65
End: 2025-05-02

## 2025-05-02 NOTE — PROGRESS NOTES
Portal active: Yes  Chart reviewed, immunization record updated.  No new results noted on [facility] or [facility] web site.  Care Everywhere updated.   Patient care coordination note  Next PCP visit Not scheduled  LOV with PCP 01/24/2025    Attempted to contact patient about a cervical cancer screening. She is on the [facility] Cervical cancer screening gap report. Left a message for a call back.

## 2025-05-27 DIAGNOSIS — J45.20 MILD INTERMITTENT ASTHMA WITHOUT COMPLICATION: Primary | ICD-10-CM

## 2025-05-27 RX ORDER — FLUTICASONE PROPIONATE AND SALMETEROL 250; 50 UG/1; UG/1
1 POWDER RESPIRATORY (INHALATION) 2 TIMES DAILY
Qty: 180 EACH | Refills: 3 | Status: SHIPPED | OUTPATIENT
Start: 2025-05-27 | End: 2026-05-27

## 2025-07-24 DIAGNOSIS — Z12.31 OTHER SCREENING MAMMOGRAM: ICD-10-CM

## (undated) DEVICE — SOL IRRI STRL WATER 1000ML

## (undated) DEVICE — KIT VIA CUSTOM PROCEDURE

## (undated) DEVICE — KIT BIOGUARD AIR WTR SUC VALVE

## (undated) DEVICE — UNDERPAD DELUXE FLUFF 30X30IN

## (undated) DEVICE — CANNULA SSOFT C02 MALE 14FT

## (undated) DEVICE — ELECTRODE MEDI-TRACE 855 FOAM

## (undated) DEVICE — TUBING IRRIGATION W/ AIR

## (undated) DEVICE — GOWN NONREINF SET-IN SLV XL

## (undated) DEVICE — LINER SUCTION CANNISTER REGUGA

## (undated) DEVICE — SPONGE DRY VIA GREEN

## (undated) DEVICE — CONNECTOR WATERJET ENDO